# Patient Record
Sex: MALE
[De-identification: names, ages, dates, MRNs, and addresses within clinical notes are randomized per-mention and may not be internally consistent; named-entity substitution may affect disease eponyms.]

---

## 2018-06-28 ENCOUNTER — TRANSCRIPTION ENCOUNTER (OUTPATIENT)
Age: 33
End: 2018-06-28

## 2024-03-20 ENCOUNTER — APPOINTMENT (OUTPATIENT)
Dept: PAIN MANAGEMENT | Facility: CLINIC | Age: 39
End: 2024-03-20
Payer: COMMERCIAL

## 2024-03-20 VITALS — WEIGHT: 170 LBS | HEIGHT: 68 IN | BODY MASS INDEX: 25.76 KG/M2

## 2024-03-20 DIAGNOSIS — M54.16 RADICULOPATHY, LUMBAR REGION: ICD-10-CM

## 2024-03-20 DIAGNOSIS — M54.50 LOW BACK PAIN, UNSPECIFIED: ICD-10-CM

## 2024-03-20 PROBLEM — Z00.00 ENCOUNTER FOR PREVENTIVE HEALTH EXAMINATION: Status: ACTIVE | Noted: 2024-03-20

## 2024-03-20 PROCEDURE — ZZZZZ: CPT

## 2024-03-20 RX ORDER — DEXTROAMPHETAMINE SACCHARATE, AMPHETAMINE ASPARTATE, DEXTROAMPHETAMINE SULFATE, AND AMPHETAMINE SULFATE 5; 5; 5; 5 MG/1; MG/1; MG/1; MG/1
20 TABLET ORAL
Refills: 0 | Status: ACTIVE | COMMUNITY

## 2024-03-20 RX ORDER — LEVOTHYROXINE SODIUM 200 UG/1
CAPSULE ORAL
Refills: 0 | Status: ACTIVE | COMMUNITY

## 2024-03-20 NOTE — ASSESSMENT
[FreeTextEntry1] : This is a 38-year-old male with complaints of lower back pain with radicular features into the left lower extremity. Pain is associated with numbness and tingling. He has had this pain for several years and notes that it has progressed. The pain makes it difficult for him to perform his work duties and ADLs. It impacts his sleep, and he wakes up with spasms. We will obtain an updated MRI of the lumbar spine for further evaluation, and he will follow up in 3 weeks to go over the results. All this patients questions were answered and the conversation was understood well.   Lumbar MRI without contrast was ordered to delineate spine pathology such as disc displacement and stenosis.  I, Megan Macedo, attest that this documentation has been prepared under the direction and in the presence of Provider Dasia Ruggiero MD.   Thank you for allowing me to assist in the management of this patient.    Best Regards,    Dasia Ruggiero M.D., FAAPMR   Diplomate, American Board of Physical Medicine and Rehabilitation Diplomate, American Board of Pain Medicine  Diplomate, American Board of Pain Management

## 2024-03-20 NOTE — PHYSICAL EXAM
[Normal Coordination] : normal coordination [Normal DTR UE/LE] : normal DTR UE/LE  [Normal Sensation] : normal sensation [Normal Mood and Affect] : normal mood and affect [Oriented] : oriented [Able to Communicate] : able to communicate [Well Developed] : well developed [Well Nourished] : well nourished [Flexion] : flexion [Extension] : extension [NL (90)] : forward flexion 90 degrees [NL (30)] : extension 30 degrees [FreeTextEntry8] : L>R [] : negative sitting straight leg raise [FreeTextEntry9] : Extension>flexion

## 2024-03-20 NOTE — HISTORY OF PRESENT ILLNESS
[FreeTextEntry1] : This is a 38-year-old male here to establish care for lower back pain. The lower back pain radiates into the left lower extremity. Pain is associated with numbness and tingling. He has a long-standing history of pain and notes that it has progressed. He was previously seen in the office by Dr. Carrion in 2010. His pain is associated with spasms which make it difficult for him to perform him his work duties and ADLs. Yesterday, he experienced a severe spasm which he states was his last "straw." His pain is causing him to be depressed, difficulty sleeping, and he also notes that he has fissures from straining to use the bathroom. He notes that he previously was taking opioids (oxycodone 30 mg) and also recently trialed cannabis for his pain. He stated that he is not interested in opioids, however he is open to medicinal cannabis. The pain has not responded to conservative care, including medications, stretching, as well as active modalities, such as physical therapy. I advised the patient that we do not participate in the medicinal cannabis program, and we no longer prescribe long term opioids for chronic pain. We discussed trialing injection therapy, but he is hesitant to move forward. The MRI of the lumbar spine is from several years ago and I advised that an updated one would be needed before moving forward with interventional treatment.

## 2024-04-18 ENCOUNTER — APPOINTMENT (OUTPATIENT)
Dept: PAIN MANAGEMENT | Facility: CLINIC | Age: 39
End: 2024-04-18

## 2025-02-27 ENCOUNTER — APPOINTMENT (OUTPATIENT)
Dept: NEUROSURGERY | Facility: CLINIC | Age: 40
End: 2025-02-27
Payer: COMMERCIAL

## 2025-02-27 ENCOUNTER — NON-APPOINTMENT (OUTPATIENT)
Age: 40
End: 2025-02-27

## 2025-02-27 VITALS — WEIGHT: 170 LBS | HEIGHT: 68 IN | BODY MASS INDEX: 25.76 KG/M2

## 2025-02-27 DIAGNOSIS — Z83.3 FAMILY HISTORY OF DIABETES MELLITUS: ICD-10-CM

## 2025-02-27 DIAGNOSIS — M54.50 LOW BACK PAIN, UNSPECIFIED: ICD-10-CM

## 2025-02-27 DIAGNOSIS — Z86.39 PERSONAL HISTORY OF OTHER ENDOCRINE, NUTRITIONAL AND METABOLIC DISEASE: ICD-10-CM

## 2025-02-27 DIAGNOSIS — Z78.9 OTHER SPECIFIED HEALTH STATUS: ICD-10-CM

## 2025-02-27 DIAGNOSIS — G95.20 UNSPECIFIED CORD COMPRESSION: ICD-10-CM

## 2025-02-27 DIAGNOSIS — Z82.49 FAMILY HISTORY OF ISCHEMIC HEART DISEASE AND OTHER DISEASES OF THE CIRCULATORY SYSTEM: ICD-10-CM

## 2025-02-27 DIAGNOSIS — Z84.89 FAMILY HISTORY OF OTHER SPECIFIED CONDITIONS: ICD-10-CM

## 2025-02-27 DIAGNOSIS — Z82.61 FAMILY HISTORY OF ARTHRITIS: ICD-10-CM

## 2025-02-27 DIAGNOSIS — Z80.9 FAMILY HISTORY OF MALIGNANT NEOPLASM, UNSPECIFIED: ICD-10-CM

## 2025-02-27 DIAGNOSIS — Z82.3 FAMILY HISTORY OF STROKE: ICD-10-CM

## 2025-02-27 PROCEDURE — 99204 OFFICE O/P NEW MOD 45 MIN: CPT

## 2025-03-07 ENCOUNTER — OUTPATIENT (OUTPATIENT)
Dept: OUTPATIENT SERVICES | Facility: HOSPITAL | Age: 40
LOS: 1 days | End: 2025-03-07
Payer: COMMERCIAL

## 2025-03-07 ENCOUNTER — RESULT REVIEW (OUTPATIENT)
Age: 40
End: 2025-03-07

## 2025-03-07 DIAGNOSIS — G95.20 UNSPECIFIED CORD COMPRESSION: ICD-10-CM

## 2025-03-07 PROCEDURE — 72052 X-RAY EXAM NECK SPINE 6/>VWS: CPT

## 2025-03-07 PROCEDURE — 72141 MRI NECK SPINE W/O DYE: CPT | Mod: 26

## 2025-03-07 PROCEDURE — 72141 MRI NECK SPINE W/O DYE: CPT

## 2025-03-07 PROCEDURE — 72052 X-RAY EXAM NECK SPINE 6/>VWS: CPT | Mod: 26

## 2025-03-08 DIAGNOSIS — G95.20 UNSPECIFIED CORD COMPRESSION: ICD-10-CM

## 2025-03-13 ENCOUNTER — APPOINTMENT (OUTPATIENT)
Dept: NEUROSURGERY | Facility: CLINIC | Age: 40
End: 2025-03-13

## 2025-06-02 ENCOUNTER — APPOINTMENT (OUTPATIENT)
Dept: NEUROSURGERY | Facility: CLINIC | Age: 40
End: 2025-06-02

## 2025-06-16 ENCOUNTER — APPOINTMENT (OUTPATIENT)
Dept: NEUROSURGERY | Facility: CLINIC | Age: 40
End: 2025-06-16

## 2025-07-21 ENCOUNTER — APPOINTMENT (OUTPATIENT)
Dept: NEUROSURGERY | Facility: CLINIC | Age: 40
End: 2025-07-21
Payer: COMMERCIAL

## 2025-07-21 ENCOUNTER — NON-APPOINTMENT (OUTPATIENT)
Age: 40
End: 2025-07-21

## 2025-07-21 VITALS — WEIGHT: 170 LBS | HEIGHT: 68 IN | BODY MASS INDEX: 25.76 KG/M2

## 2025-07-21 DIAGNOSIS — M54.2 CERVICALGIA: ICD-10-CM

## 2025-07-21 DIAGNOSIS — Z86.39 PERSONAL HISTORY OF OTHER ENDOCRINE, NUTRITIONAL AND METABOLIC DISEASE: ICD-10-CM

## 2025-07-21 PROCEDURE — 99214 OFFICE O/P EST MOD 30 MIN: CPT
